# Patient Record
Sex: MALE | Race: WHITE | NOT HISPANIC OR LATINO
[De-identification: names, ages, dates, MRNs, and addresses within clinical notes are randomized per-mention and may not be internally consistent; named-entity substitution may affect disease eponyms.]

---

## 2021-09-28 ENCOUNTER — NON-APPOINTMENT (OUTPATIENT)
Age: 66
End: 2021-09-28

## 2021-10-05 ENCOUNTER — APPOINTMENT (OUTPATIENT)
Dept: THORACIC SURGERY | Facility: CLINIC | Age: 66
End: 2021-10-05
Payer: COMMERCIAL

## 2021-10-05 VITALS — BODY MASS INDEX: 27.28 KG/M2 | WEIGHT: 180 LBS | HEIGHT: 68 IN

## 2021-10-05 DIAGNOSIS — F17.210 NICOTINE DEPENDENCE, CIGARETTES, UNCOMPLICATED: ICD-10-CM

## 2021-10-05 DIAGNOSIS — Z87.09 PERSONAL HISTORY OF OTHER DISEASES OF THE RESPIRATORY SYSTEM: ICD-10-CM

## 2021-10-05 DIAGNOSIS — Z80.1 FAMILY HISTORY OF MALIGNANT NEOPLASM OF TRACHEA, BRONCHUS AND LUNG: ICD-10-CM

## 2021-10-05 PROCEDURE — G0296 VISIT TO DETERM LDCT ELIG: CPT

## 2021-10-05 NOTE — ASSESSMENT
[Discussed Risks and Advised to Quit Smoking] : Discussed risks and advised to quit smoking [Discussed Cessation Medication] : cessation medication was discussed [Ready] : Patient is ready for cessation intervention [Contemplation] : Contemplation: The patient is considering quitting smoking [de-identified] : Acknowledged how  difficult it is to quit smoking. Advised that quitting smoking is the most important thing a person can do for their health. Discussed strategies to deal with cravings. Discussed the importance of having a strategy planned in advance of quit date. Discussed use of daily nicotine patch and use of NRT puffer.

## 2021-10-05 NOTE — REASON FOR VISIT
[Other Location: e.g. School (Enter Location, City,State)___] : at [unfilled], at the time of the visit. [Verbal consent obtained from patient] : the patient, [unfilled] [Annual Follow-Up] : an annual follow-up visit [Review of Eligibility] : review of eligibility [Low-Dose CT Screening Discussion] : low-dose CT lung cancer screening discussion

## 2021-10-05 NOTE — HISTORY OF PRESENT ILLNESS
[Current] : current smoker [_____ pack-years] : [unfilled] pack-years [TextBox_13] : Responded to reminder letter\par PCP Dr. Katherine Rojas\par \par CHRISTIANO HONEYCUTT  had telephonic visit for a review of eligibility and discussion of the Low dose CT lung cancer screening program. A telephonic visit occurred due to the patient not having access to a smart phone or a computer for an audio/visual visit.  The following was reviewed and confirmed the patient meets screening eligibility criteria.\par -Age 66 year\par Smoking Status:\par -Current smoker\par -Number of pack(s) per day: 3/4 PPD\par -Number of years smoked: 50\par -Number of pack years smokin.5\par \par Mr. HONEYCUTT   denies any signs or symptoms of lung cancer including new cough, changing cough, hemoptysis, and unintentional weight loss. \par \par Mr. HONEYCUTT reports histroy of COPD. He denies any personal history of lung cancer. Reports lung cancer in a 1st degree relative:Mother. Reports no lung cancer in a 2nd degree relative. Denies any  history of  occupational exposures.\par

## 2021-10-05 NOTE — DATA REVIEWED
[Lung Cancer Screening] : Patient underwent lung cancer screening [2] : 2 [TextBox_12] : 03/17 [TextBox_27] : 06/18 [TextBox_42] : 08/20 [TextBox_52] : 4

## 2021-10-05 NOTE — PLAN
[[facility]] : referred to [facility] [phone] [Other: ___] : referred to [unfilled] [Smoking Cessation] : smoking cessation [FreeTextEntry1] : Plan:\par \par -Low dose CT chest for lung cancer screening. Dr. Katherine Rojas ordered the low dose CT.     \par Mr. Honeycutt to bring prescription to radiology appointment.\par \par -Follow up with patient and his referring provider after his LDCT results have been reviewed by the multidisciplinary clinical team, if needed.\par \par -Encouraged smoking cessation.\par \par -Referred to smoking cessation program at [facility] [phone] and [email or web address]\par \par -Referred to [facility]\par \par Should I screen? tool utilized. 6 Year risk of lung cancer is   7.2%. Patient wishes to proceed with screening.\par \par Engaged in discussion regarding risks of screening during Covid-19 pandemic and precautions that are being used  to reduce exposure.\par \par Engaged in shared decision making with Mr. HONEYCUTT . Answered all questions. He verbalized understanding and agreement. He knows to call back with and questions or concerns.\par

## 2021-11-05 ENCOUNTER — NON-APPOINTMENT (OUTPATIENT)
Age: 66
End: 2021-11-05

## 2022-08-16 ENCOUNTER — APPOINTMENT (OUTPATIENT)
Dept: CARDIOLOGY | Facility: CLINIC | Age: 67
End: 2022-08-16

## 2022-11-17 ENCOUNTER — NON-APPOINTMENT (OUTPATIENT)
Age: 67
End: 2022-11-17

## 2023-01-14 ENCOUNTER — NON-APPOINTMENT (OUTPATIENT)
Age: 68
End: 2023-01-14

## 2023-11-11 ENCOUNTER — TRANSCRIPTION ENCOUNTER (OUTPATIENT)
Age: 68
End: 2023-11-11

## 2024-04-30 ENCOUNTER — TRANSCRIPTION ENCOUNTER (OUTPATIENT)
Age: 69
End: 2024-04-30

## 2024-05-01 ENCOUNTER — NON-APPOINTMENT (OUTPATIENT)
Age: 69
End: 2024-05-01

## 2024-05-10 ENCOUNTER — APPOINTMENT (OUTPATIENT)
Dept: SURGERY | Facility: CLINIC | Age: 69
End: 2024-05-10
Payer: COMMERCIAL

## 2024-05-10 ENCOUNTER — TRANSCRIPTION ENCOUNTER (OUTPATIENT)
Age: 69
End: 2024-05-10

## 2024-05-10 VITALS — SYSTOLIC BLOOD PRESSURE: 144 MMHG | TEMPERATURE: 98.3 F | HEART RATE: 64 BPM | DIASTOLIC BLOOD PRESSURE: 85 MMHG

## 2024-05-10 DIAGNOSIS — Z86.79 PERSONAL HISTORY OF OTHER DISEASES OF THE CIRCULATORY SYSTEM: ICD-10-CM

## 2024-05-10 DIAGNOSIS — Z86.39 PERSONAL HISTORY OF OTHER ENDOCRINE, NUTRITIONAL AND METABOLIC DISEASE: ICD-10-CM

## 2024-05-10 DIAGNOSIS — K56.50 INTESTINAL ADHESIONS [BANDS], UNSPECIFIED AS TO PARTIAL VERSUS COMPLETE OBSTRUCTION: ICD-10-CM

## 2024-05-10 PROCEDURE — 99213 OFFICE O/P EST LOW 20 MIN: CPT

## 2024-05-10 RX ORDER — ATORVASTATIN CALCIUM 80 MG/1
80 TABLET, FILM COATED ORAL
Refills: 0 | Status: ACTIVE | COMMUNITY

## 2024-05-10 RX ORDER — LOSARTAN POTASSIUM 100 MG/1
100 TABLET, FILM COATED ORAL
Refills: 0 | Status: ACTIVE | COMMUNITY

## 2024-05-10 RX ORDER — ESCITALOPRAM OXALATE 20 MG/1
20 TABLET, FILM COATED ORAL
Refills: 0 | Status: ACTIVE | COMMUNITY

## 2024-05-10 RX ORDER — CLOPIDOGREL BISULFATE 75 MG/1
75 TABLET, FILM COATED ORAL
Refills: 0 | Status: ACTIVE | COMMUNITY

## 2024-05-10 RX ORDER — FLUTICASONE FUROATE, UMECLIDINIUM BROMIDE AND VILANTEROL TRIFENATATE 100; 62.5; 25 UG/1; UG/1; UG/1
100-62.5-25 POWDER RESPIRATORY (INHALATION)
Refills: 0 | Status: ACTIVE | COMMUNITY

## 2024-05-10 NOTE — PLAN
[FreeTextEntry1] : Encouraged pt to advance diet to regular.  Eat small meals and always chew food well. Try to avoid "overdoing" it w nictotine lozengers or any one food product.  Everything in moderation.  Pt agrees and states his wife is s/p gastric bypass/sleeve surgery so he tends to eat smaller meals with her.

## 2024-05-10 NOTE — CONSULT LETTER
[Dear  ___] : Dear  [unfilled], [Courtesy Letter:] : I had the pleasure of seeing your patient, [unfilled], in my office today. [Please see my note below.] : Please see my note below. [Sincerely,] : Sincerely, [FreeTextEntry3] : Sue Arrieta MD FACS Director, [facility] [place] Division of General and Acute Care Surgery Director, Surgical Quality for [facility] Interim Director [facility]   Office phone: [phone] Cell phone:  [phone] email:  [email or web address]

## 2024-05-10 NOTE — PHYSICAL EXAM
[Respiratory Effort] : normal respiratory effort [de-identified] : NAD [de-identified] : soft, nontender, nondistended, no hernias

## 2024-05-10 NOTE — HISTORY OF PRESENT ILLNESS
[de-identified] : 68 yr old male s/p spontaneous sbo without hx of abdominal surgery.  He had been taking a lot of nicotine lozengers at the time - admitting he was "overdoing it". He was admitted to [facility] form 4/27-4/29/24 and had an NGT and was treated with IVF/bowel rest. He started having bowel function and was advanced to a full liquid diet.  After discharge he has been eating a soft diet of mostly yogurt, ice cream and frozen veggie lasagna.  He has not had any difficulty eating and has been having normal, but somewhat smaller caliber bowel movements.

## 2025-02-07 ENCOUNTER — APPOINTMENT (OUTPATIENT)
Dept: THORACIC SURGERY | Facility: CLINIC | Age: 70
End: 2025-02-07
Payer: COMMERCIAL

## 2025-02-07 VITALS — BODY MASS INDEX: 24.4 KG/M2 | WEIGHT: 161 LBS | HEIGHT: 68 IN

## 2025-02-07 DIAGNOSIS — Z87.891 PERSONAL HISTORY OF NICOTINE DEPENDENCE: ICD-10-CM

## 2025-02-07 PROCEDURE — ACP01: CPT | Mod: NC
